# Patient Record
Sex: FEMALE | Race: WHITE | NOT HISPANIC OR LATINO | URBAN - METROPOLITAN AREA
[De-identification: names, ages, dates, MRNs, and addresses within clinical notes are randomized per-mention and may not be internally consistent; named-entity substitution may affect disease eponyms.]

---

## 2019-09-12 ENCOUNTER — TRANSCRIBE ORDERS (OUTPATIENT)
Dept: ADMINISTRATIVE | Facility: HOSPITAL | Age: 64
End: 2019-09-12

## 2019-09-12 DIAGNOSIS — G47.30 SLEEP APNEA, UNSPECIFIED TYPE: Primary | ICD-10-CM

## 2024-05-09 ENCOUNTER — TELEPHONE (OUTPATIENT)
Dept: GASTROENTEROLOGY | Facility: CLINIC | Age: 69
End: 2024-05-09

## 2024-05-09 ENCOUNTER — OFFICE VISIT (OUTPATIENT)
Dept: GASTROENTEROLOGY | Facility: CLINIC | Age: 69
End: 2024-05-09
Payer: MEDICARE

## 2024-05-09 VITALS
HEIGHT: 64 IN | SYSTOLIC BLOOD PRESSURE: 120 MMHG | BODY MASS INDEX: 31.24 KG/M2 | DIASTOLIC BLOOD PRESSURE: 82 MMHG | WEIGHT: 183 LBS

## 2024-05-09 DIAGNOSIS — K57.90 DIVERTICULAR DISEASE: Primary | ICD-10-CM

## 2024-05-09 DIAGNOSIS — Z12.11 SCREENING FOR COLORECTAL CANCER: ICD-10-CM

## 2024-05-09 DIAGNOSIS — Z12.12 SCREENING FOR COLORECTAL CANCER: ICD-10-CM

## 2024-05-09 PROCEDURE — 99204 OFFICE O/P NEW MOD 45 MIN: CPT | Performed by: INTERNAL MEDICINE

## 2024-05-09 RX ORDER — VENLAFAXINE HYDROCHLORIDE 150 MG/1
225 CAPSULE, EXTENDED RELEASE ORAL DAILY
COMMUNITY

## 2024-05-09 RX ORDER — MULTIVIT-MIN/IRON/FOLIC ACID/K 18-600-40
CAPSULE ORAL 2 TIMES DAILY
COMMUNITY

## 2024-05-09 RX ORDER — MULTIVITAMIN
1 CAPSULE ORAL DAILY
COMMUNITY

## 2024-05-09 RX ORDER — LEVOTHYROXINE SODIUM 0.07 MG/1
75 TABLET ORAL DAILY
COMMUNITY

## 2024-05-09 RX ORDER — ALPRAZOLAM 0.5 MG/1
0.5 TABLET ORAL
COMMUNITY

## 2024-05-09 NOTE — PROGRESS NOTES
[facility] - Outpatient Consultation  Caryn Crowe 68 y.o. female MRN: [id number]  Encounter: [id number]    ASSESSMENT AND PLAN:    1. Diverticular disease  -     Colonoscopy; Future; Expected date: 05/09/2024    2. Screening for colorectal cancer  -     Colonoscopy; Future; Expected date: 05/09/2024      Assessment & Plan  1. Diverticulitis.  Patient with a history of recurrent diverticulitis a total of 4 episodes over the past year.  Consulting with colorectal surgery for sigmoid resection.  Will plan on evaluating with colonoscopy in order to visualize and locate where diverticular disease is most prominent.  Will need to rule out any other polyps or malignancy.  Average risk for colorectal cancer but has not had colonoscopy in the past.    ---    Chief Complaint   Patient presents with    Diverticulisis     Referred by Dr. Baldwin         HPI:   Caryn Crowe is a 68 y.o. female presenting to establish care.   History of Present Illness  The patient is a 68-year-old female who is presenting as a consultation from Dr. Schwarz regarding an episode of diverticulitis. The patient was recently on Flagyl in 04/2025. She did go to see Dr. Morel due to recurrent episodes of diverticulitis. Most recent CT abdomen and pelvis with IV contrast on 04/08/2025 showed acute sigmoid diverticulitis, uncomplicated. She has had 4 episodes of diverticulitis starting about a year ago and was treated with IV antibiotics on 04/08/2025. She has never had a colonoscopy before in the past.    The patient has consulted with Dr. Ingram and is seeking to schedule a colonoscopy at the earliest convenience. She has experienced 4 episodes of diverticulitis in the past year, the most recent of which was particularly severe, necessitating a prolonged recovery period. Currently, she reports feeling well, with no fevers, nausea, vomiting, or abdominal pain. However, she experiences frequent heartburn,  "predominantly at night, for which she takes Tums, which provides relief. She denies any alterations in her bowel movements, including diarrhea, constipation, or hematochezia. She has no history of abdominal surgeries and is not currently on any anticoagulants or diabetes medications.    Supplemental Information  She has had surgery for breast cancer.   Her daughter was diagnosed with ulcerative colitis at the age of 13. She denies any family history of stomach or colon cancer.    Historical Information   Past Medical History:   Diagnosis Date    Breast CA (HCC)      Past Surgical History:   Procedure Laterality Date    BREAST LUMPECTOMY       Social History     Substance and Sexual Activity   Alcohol Use Not Currently     Social History     Substance and Sexual Activity   Drug Use Not on file     Social History     Tobacco Use   Smoking Status Former    Types: Cigarettes   Smokeless Tobacco Never     Family History   Problem Relation Age of Onset    Colon cancer Neg Hx     Colon polyps Neg Hx        Meds/Allergies     Current Outpatient Medications:     ALPRAZolam (XANAX) 0.5 mg tablet    Ascorbic Acid (Vitamin C) 500 MG CAPS    Bacillus Coagulans-Inulin (PROBIOTIC-PREBIOTIC PO)    Calcium Carbonate-Vitamin D (CALTRATE 600+D PO)    levothyroxine 75 mcg tablet    Multiple Vitamin (multivitamin) capsule    venlafaxine (EFFEXOR-XR) 150 mg 24 hr capsule  Allergies   Allergen Reactions    Fentanyl Other (See Comments)     Sweating, heart races, chest pain, dizziness        PHYSICAL EXAM:    Blood pressure 120/82, height 5' 4\" (1.626 m), weight 83 kg (183 lb). Body mass index is 31.41 kg/m².  Physical Exam      General Appearance: No apparent distress, cooperative, alert.  Eyes: Anicteric.  Gastrointestinal: Soft, non-tender, non-distended; normal bowel sounds; no masses, no organomegaly.    Rectal: Deferred.  Musculoskeletal: No edema.  Skin: No jaundice.     OTHER LAB RESULTS:   No results found for: \"WBC\", \"HGB\", " "\"MCV\", \"PLT\", \"INR\"  No results found for: \"NA\", \"K\", \"CL\", \"CO2\", \"ANIONGAP\", \"BUN\", \"CREATININE\", \"GLUCOSE\", \"GLUF\", \"CALCIUM\", \"CORRECTEDCA\", \"AST\", \"ALT\", \"ALKPHOS\", \"PROT\", \"BILITOT\", \"EGFR\"  No results found for: \"IRON\", \"TIBC\", \"FERRITIN\"  No results found for: \"LIPASE\"    OTHER RADIOLOGY RESULTS:   No results found.      "

## 2024-05-09 NOTE — TELEPHONE ENCOUNTER
Scheduled date of colonoscopy (as of today):06/11/24  Physician performing colonoscopy:Cisco  Location of colonoscopy:[facility]  Bowel prep reviewed with patient:Miralax  Instructions reviewed with patient by:MICHELET  Clearances: NONE    Pt did not sign consent

## 2024-05-09 NOTE — TELEPHONE ENCOUNTER
Pt called for the time of her colon, informed her that she will get a call on the day prior with her time

## 2024-05-28 ENCOUNTER — ANESTHESIA (OUTPATIENT)
Dept: ANESTHESIOLOGY | Facility: AMBULATORY SURGERY CENTER | Age: 69
End: 2024-05-28

## 2024-05-28 ENCOUNTER — ANESTHESIA EVENT (OUTPATIENT)
Dept: ANESTHESIOLOGY | Facility: AMBULATORY SURGERY CENTER | Age: 69
End: 2024-05-28

## 2024-06-04 ENCOUNTER — TELEPHONE (OUTPATIENT)
Age: 69
End: 2024-06-04

## 2024-06-04 NOTE — TELEPHONE ENCOUNTER
Procedure confirmed  Colonoscopy     Via: Voice mail    Instructions given: Given to Patient at Visit     Prep Given: Miralax/Dulcolax    Call the office if there are any questions.

## 2024-06-11 ENCOUNTER — ANESTHESIA (OUTPATIENT)
Dept: GASTROENTEROLOGY | Facility: AMBULATORY SURGERY CENTER | Age: 69
End: 2024-06-11

## 2024-06-11 ENCOUNTER — ANESTHESIA EVENT (OUTPATIENT)
Dept: GASTROENTEROLOGY | Facility: AMBULATORY SURGERY CENTER | Age: 69
End: 2024-06-11

## 2024-06-11 ENCOUNTER — HOSPITAL ENCOUNTER (OUTPATIENT)
Dept: GASTROENTEROLOGY | Facility: AMBULATORY SURGERY CENTER | Age: 69
Discharge: HOME | End: 2024-06-11
Attending: INTERNAL MEDICINE
Payer: MEDICARE

## 2024-06-11 VITALS
TEMPERATURE: 98.2 F | RESPIRATION RATE: 19 BRPM | WEIGHT: 177 LBS | SYSTOLIC BLOOD PRESSURE: 180 MMHG | BODY MASS INDEX: 29.49 KG/M2 | HEART RATE: 73 BPM | HEIGHT: 65 IN | OXYGEN SATURATION: 94 % | DIASTOLIC BLOOD PRESSURE: 71 MMHG

## 2024-06-11 DIAGNOSIS — Z12.11 SCREENING FOR COLORECTAL CANCER: ICD-10-CM

## 2024-06-11 DIAGNOSIS — K57.90 DIVERTICULAR DISEASE: ICD-10-CM

## 2024-06-11 DIAGNOSIS — Z12.12 SCREENING FOR COLORECTAL CANCER: ICD-10-CM

## 2024-06-11 PROCEDURE — 45385 COLONOSCOPY W/LESION REMOVAL: CPT | Performed by: INTERNAL MEDICINE

## 2024-06-11 PROCEDURE — 88305 TISSUE EXAM BY PATHOLOGIST: CPT | Performed by: PATHOLOGY

## 2024-06-11 RX ORDER — PROPOFOL 10 MG/ML
INJECTION, EMULSION INTRAVENOUS AS NEEDED
Status: DISCONTINUED | OUTPATIENT
Start: 2024-06-11 | End: 2024-06-11

## 2024-06-11 RX ORDER — SODIUM CHLORIDE, SODIUM LACTATE, POTASSIUM CHLORIDE, CALCIUM CHLORIDE 600; 310; 30; 20 MG/100ML; MG/100ML; MG/100ML; MG/100ML
50 INJECTION, SOLUTION INTRAVENOUS CONTINUOUS
Status: DISCONTINUED | OUTPATIENT
Start: 2024-06-11 | End: 2024-06-15 | Stop reason: HOSPADM

## 2024-06-11 RX ORDER — SODIUM CHLORIDE, SODIUM LACTATE, POTASSIUM CHLORIDE, CALCIUM CHLORIDE 600; 310; 30; 20 MG/100ML; MG/100ML; MG/100ML; MG/100ML
INJECTION, SOLUTION INTRAVENOUS CONTINUOUS PRN
Status: DISCONTINUED | OUTPATIENT
Start: 2024-06-11 | End: 2024-06-11

## 2024-06-11 RX ORDER — LIDOCAINE HYDROCHLORIDE 10 MG/ML
INJECTION, SOLUTION EPIDURAL; INFILTRATION; INTRACAUDAL; PERINEURAL AS NEEDED
Status: DISCONTINUED | OUTPATIENT
Start: 2024-06-11 | End: 2024-06-11

## 2024-06-11 RX ADMIN — LIDOCAINE HYDROCHLORIDE 25 MG: 10 INJECTION, SOLUTION EPIDURAL; INFILTRATION; INTRACAUDAL; PERINEURAL at 13:04

## 2024-06-11 RX ADMIN — PROPOFOL 50 MG: 10 INJECTION, EMULSION INTRAVENOUS at 13:18

## 2024-06-11 RX ADMIN — PROPOFOL 100 MG: 10 INJECTION, EMULSION INTRAVENOUS at 13:04

## 2024-06-11 RX ADMIN — PROPOFOL 50 MG: 10 INJECTION, EMULSION INTRAVENOUS at 13:10

## 2024-06-11 RX ADMIN — SODIUM CHLORIDE, SODIUM LACTATE, POTASSIUM CHLORIDE, CALCIUM CHLORIDE: 600; 310; 30; 20 INJECTION, SOLUTION INTRAVENOUS at 13:04

## 2024-06-11 RX ADMIN — SODIUM CHLORIDE, SODIUM LACTATE, POTASSIUM CHLORIDE, CALCIUM CHLORIDE 50 ML/HR: 600; 310; 30; 20 INJECTION, SOLUTION INTRAVENOUS at 12:59

## 2024-06-11 RX ADMIN — PROPOFOL 50 MG: 10 INJECTION, EMULSION INTRAVENOUS at 13:14

## 2024-06-11 RX ADMIN — PROPOFOL 50 MG: 10 INJECTION, EMULSION INTRAVENOUS at 13:07

## 2024-06-11 NOTE — ANESTHESIA PREPROCEDURE EVALUATION
Procedure:  COLONOSCOPY    Relevant Problems   No relevant active problems   Hx. Breast Ca Left    Physical Exam    Airway    Mallampati score: II  TM Distance: >3 FB  Neck ROM: full     Dental   No notable dental hx     Cardiovascular      Pulmonary      Other Findings  post-pubertal.      Anesthesia Plan  ASA Score- 2     Anesthesia Type- IV sedation with anesthesia with ASA Monitors.         Additional Monitors:     Airway Plan:            Plan Factors-Exercise tolerance (METS): >4 METS.    Chart reviewed.    Patient summary reviewed.    Patient is a current smoker (4 cigs./day).  Patient did not smoke on day of surgery.            Induction- intravenous.    Postoperative Plan-         Informed Consent- Anesthetic plan and risks discussed with patient.  I personally reviewed this patient with the CRNA. Discussed and agreed on the Anesthesia Plan with the CRNA..

## 2024-06-11 NOTE — H&P
"History and Physical -  [facility] Specialists  Caryn Crowe 69 y.o. female MRN: [id number]    HPI: Caryn Crowe is a 69 y.o. female who presents for colonoscopy for history of diverticulitis    REVIEW OF SYSTEMS: Per the HPI, and otherwise unremarkable.    Historical Information   Past Medical History:   Diagnosis Date    Breast CA (HCC)     Disease of thyroid gland      Past Surgical History:   Procedure Laterality Date    BREAST LUMPECTOMY Left     TUBAL LIGATION       Social History   Social History     Substance and Sexual Activity   Alcohol Use Not Currently     Social History     Substance and Sexual Activity   Drug Use Never     Social History     Tobacco Use   Smoking Status Every Day    Current packs/day: 0.25    Types: Cigarettes   Smokeless Tobacco Never     Family History   Problem Relation Age of Onset    Colon cancer Neg Hx     Colon polyps Neg Hx        Meds/Allergies       Current Outpatient Medications:     ALPRAZolam (XANAX) 0.5 mg tablet    Ascorbic Acid (Vitamin C) 500 MG CAPS    Bacillus Coagulans-Inulin (PROBIOTIC-PREBIOTIC PO)    Calcium Carbonate-Vitamin D (CALTRATE 600+D PO)    levothyroxine 75 mcg tablet    Multiple Vitamin (multivitamin) capsule    venlafaxine (EFFEXOR-XR) 150 mg 24 hr capsule    Current Facility-Administered Medications:     lactated ringers infusion, 50 mL/hr, Intravenous, Continuous    Allergies   Allergen Reactions    Fentanyl Other (See Comments)     Sweating, heart races, chest pain, dizziness        Objective     Temp 98.2 °F (36.8 °C) (Temporal)   Ht 5' 4.5\" (1.638 m)   Wt 80.3 kg (177 lb)   BMI 29.91 kg/m²     PHYSICAL EXAM    General Appearance: NAD, cooperative, alert  Eyes: Anicteric  GI:  Soft, non-tender, non-distended; normal bowel sounds; no masses, no organomegaly   Rectal: Deferred until procedure  Musculoskeletal: No edema.  Skin:  No jaundice    ASSESSMENT/PLAN:  This is a 69 y.o. year old female here for colonoscopy, and she is " stable and optimized for her procedure.

## 2024-06-11 NOTE — LETTER
Byron Ingram, attached below is the full report of her most recent colonoscopy.  She will follow-up with you for scheduling of colonic resection for her history of repeated diverticulitis flares.  The pertinent section of the report as noted below.      Diverticula in the transverse colon and sigmoid colon. Ascending colon without any diverticula, about 6 small mouth diverticula starting around 55 cm from the anal verge to the descending colon.  Descending colon is spared of diverticulitis.  Large amount of moderate to severe diverticuli noted primarily in the sigmoid colon from about 35 cm from the anal verge to about 10 cm from the anal verge.  Rectum is spared of any diverticulosis.          [facility]  [address] [place] [id number] [phone]      DATE OF SERVICE:  6/11/24    PHYSICIAN(S):  Attending:   Sherry Peck MD     Fellow:   No Staff Documented       INDICATION:  Diverticular disease, Screening for colorectal cancer      POST-OP DIAGNOSIS:  See the impression below.    HISTORY:  Prior colonoscopy: No prior colonoscopy.    BOWEL PREPARATION:  Miralax/Dulcolax      PREPROCEDURE:  Informed consent was obtained for the procedure, including sedation. Risks including but not limited to bleeding, infection, perforation, adverse drug reaction and aspiration were explained in detail. Also explained about less than 100% sensitivity with the exam and other alternatives. The patient was placed in the left lateral decubitus position.    Procedure: Colonoscopy     DETAILS OF PROCEDURE:   Patient was taken to the procedure room where a time out was performed to confirm correct patient and correct procedure. The patient underwent monitored anesthesia care, which was administered by an anesthesia professional. The patient's blood pressure, heart rate, level of consciousness, oxygen, respirations, ECG and ETCO2 were monitored throughout the procedure. A digital rectal  exam was performed. The scope was introduced through the anus and advanced to the cecum. Photodocumentation was obtained at the ileocecal valve, appendiceal orifice and retroflexed view of the rectum. Retroflexion was performed in the rectum. The quality of bowel preparation was evaluated using the [place] Bowel Preparation Scale with scores of: right colon = 2, transverse colon = 2, left colon = 2. The total BBPS score was 6. Bowel prep was adequate. The patient experienced no blood loss. The procedure was not difficult. The patient tolerated the procedure well. There were no apparent adverse events.       ANESTHESIA INFORMATION:  ASA: II  Anesthesia Type: IV Sedation with Anesthesia    MEDICATIONS:  No administrations occurring from 1301 to 1302 on 06/11/24         FINDINGS:  Internal small hemorrhoids  One polyp measuring smaller than 5 mm in the ascending colon; performed cold snare with complete en bloc removal and retrieved specimen  Diverticula in the transverse colon and sigmoid colon. Ascending colon without any diverticula, about 6 small mouth diverticula starting around 55 cm from the anal verge to the descending colon.  Descending colon is largely spared of diverticulitis.  Large amount of moderate to severe diverticuli noted primarily in the sigmoid colon from about 35 cm from the anal verge to about 10 cm from the anal verge.  Rectum is spared of any diverticulosis.      EVENTS:  Procedure Events   Event Event Time   ENDO CECUM REACHED 6/11/2024  1:12 PM   ENDO SCOPE OUT TIME 6/11/2024  1:22 PM       SPECIMENS:  ID Type Source Tests Collected by Time Destination   1 : ascending colon polyp/ cold snare Tissue Large Intestine, Right/Ascending Colon TISSUE EXAM Sherry Peck MD 6/11/2024  1:23 PM        EQUIPMENT:  Colonoscope -         IMPRESSION:  Small hemorrhoids  Subcentimeter polyp in the ascending colon was removed with cold snare  Diverticulosis in the transverse colon and sigmoid  colon        RECOMMENDATION:  Await pathology results   Repeat colonoscopy in 5 years, due: 6/10/2029  Personal history of colon polyps        Follow up with Dr. Ingram.  Resume home meds.  Resume previous diet.  Follow up with your primary care provider as previously scheduled.  Follow up with GI as needed.  The biopsy results will be available in [place] in 1-2 weeks.       Sherry Peck MD

## 2024-06-11 NOTE — ANESTHESIA POSTPROCEDURE EVALUATION
Post-Op Assessment Note    CV Status:  Stable  Pain Score: 0    Pain management: adequate    Multimodal analgesia used between 6 hours prior to anesthesia start to PACU discharge    Mental Status:  Alert and awake   Hydration Status:  Euvolemic and stable   PONV Controlled:  Controlled   Airway Patency:  Patent  Two or more mitigation strategies used for obstructive sleep apnea   Post Op Vitals Reviewed: Yes    No anethesia notable event occurred.    Staff: CRNA               /56 (06/11/24 1326)    Temp   98   Pulse 66 (06/11/24 1326)   Resp 18 (06/11/24 1326)    SpO2 97 % (06/11/24 1326)

## 2024-06-19 ENCOUNTER — TELEPHONE (OUTPATIENT)
Dept: GASTROENTEROLOGY | Facility: CLINIC | Age: 69
End: 2024-06-19

## 2024-06-19 PROCEDURE — 88305 TISSUE EXAM BY PATHOLOGIST: CPT | Performed by: PATHOLOGY

## 2024-06-19 NOTE — TELEPHONE ENCOUNTER
LVM for pt to call back and review the results    Patient with subcentimeter ascending inflammatory-type polyp.     Clinical team-can you reach out to the patient and let them know about the results of the pathology.  Recommendations as noted below.  Thank you.     Byron Crowe,     We got the results of the polyp that we removed from your colon.  This was called inflammatory polyp.  This is not precancerous and it has no concerns of cancer. We would recommend you to have a repeat colonoscopy in 5 years.     Please let us know if you have any questions or concerns.  Thank you

## 2024-06-20 ENCOUNTER — TELEPHONE (OUTPATIENT)
Age: 69
End: 2024-06-20

## 2024-06-20 NOTE — TELEPHONE ENCOUNTER
Patient contacted office returning call for pathology results. All results relayed. Patient expressed understanding.